# Patient Record
Sex: FEMALE | Race: WHITE | URBAN - METROPOLITAN AREA
[De-identification: names, ages, dates, MRNs, and addresses within clinical notes are randomized per-mention and may not be internally consistent; named-entity substitution may affect disease eponyms.]

---

## 2023-01-18 ENCOUNTER — OFFICE VISIT (OUTPATIENT)
Dept: URBAN - METROPOLITAN AREA CLINIC 58 | Facility: CLINIC | Age: 73
End: 2023-01-18
Payer: MEDICARE

## 2023-01-18 DIAGNOSIS — Z79.899 OTHER LONG TERM (CURRENT) DRUG THERAPY: ICD-10-CM

## 2023-01-18 DIAGNOSIS — M35.01 SJOGREN SYNDROME WITH KERATOCONJUNCTIVITIS: Primary | ICD-10-CM

## 2023-01-18 PROCEDURE — 92004 COMPRE OPH EXAM NEW PT 1/>: CPT | Performed by: OPTOMETRIST

## 2023-01-18 RX ORDER — FLUOROMETHOLONE 1 MG/ML
0.1 % SOLUTION/ DROPS OPHTHALMIC
Qty: 5 | Refills: 0 | Status: ACTIVE
Start: 2023-01-18

## 2023-01-18 ASSESSMENT — INTRAOCULAR PRESSURE
OS: 20
OD: 16

## 2023-01-18 NOTE — IMPRESSION/PLAN
Impression: Sjogren syndrome with keratoconjunctivitis: M35.01. Plan: Diagnosis explained in detail, condition seems severe. Start Prednisolone 1gtt OU tid x 1 wk then bid x 1 wk then qd x 1 wk then d/c. Pt to call if condition worsens. Continue AT's  PRN.

## 2023-01-18 NOTE — IMPRESSION/PLAN
Impression: Other long term (current) drug therapy: Z79.899. Plan: Patient under the care of Dr. Carlton Barrios for Plaquenil exams.

## 2023-02-03 ENCOUNTER — OFFICE VISIT (OUTPATIENT)
Dept: URBAN - METROPOLITAN AREA CLINIC 58 | Facility: CLINIC | Age: 73
End: 2023-02-03
Payer: MEDICARE

## 2023-02-03 DIAGNOSIS — H10.45 OTHER CHRONIC ALLERGIC CONJUNCTIVITIS: ICD-10-CM

## 2023-02-03 DIAGNOSIS — M35.01 SJOGREN SYNDROME WITH KERATOCONJUNCTIVITIS: Primary | ICD-10-CM

## 2023-02-03 PROCEDURE — 99213 OFFICE O/P EST LOW 20 MIN: CPT | Performed by: OPTOMETRIST

## 2023-02-03 RX ORDER — PREDNISONE 10 MG/1
10 MG TABLET ORAL
Qty: 28 | Refills: 0 | Status: ACTIVE
Start: 2023-02-03

## 2023-02-03 ASSESSMENT — INTRAOCULAR PRESSURE
OS: 16
OD: 15

## 2023-02-03 ASSESSMENT — KERATOMETRY
OD: 45.25
OS: 45.13

## 2023-02-03 NOTE — IMPRESSION/PLAN
Impression: Other chronic allergic conjunctivitis: H10.45. Plan: Discussed diagnosis in detail with patient. Start Prednisone 40mg PO QD x 1 wk, then d/c. Will continue to monitor.

## 2023-02-03 NOTE — IMPRESSION/PLAN
Impression: Sjogren syndrome with keratoconjunctivitis: M35.01. Plan: Diagnosis explained in detail. Continue FML QD. Pt to call if condition worsens. Continue AT's  PRN.

## 2023-02-10 ENCOUNTER — OFFICE VISIT (OUTPATIENT)
Dept: URBAN - METROPOLITAN AREA CLINIC 58 | Facility: CLINIC | Age: 73
End: 2023-02-10
Payer: MEDICARE

## 2023-02-10 DIAGNOSIS — M35.01 SJOGREN SYNDROME WITH KERATOCONJUNCTIVITIS: ICD-10-CM

## 2023-02-10 DIAGNOSIS — H10.45 OTHER CHRONIC ALLERGIC CONJUNCTIVITIS: Primary | ICD-10-CM

## 2023-02-10 PROCEDURE — 99213 OFFICE O/P EST LOW 20 MIN: CPT | Performed by: OPTOMETRIST

## 2023-02-10 ASSESSMENT — INTRAOCULAR PRESSURE
OD: 16
OS: 16

## 2023-02-10 NOTE — IMPRESSION/PLAN
Impression: Other chronic allergic conjunctivitis: H10.45. Plan: Discussed diagnosis in detail with patient. Prednisone didn't seem to help much, maybe slightly better. Still looks and feels like allergies to me. Recommend pt going back to allergist for care.

## 2023-02-10 NOTE — IMPRESSION/PLAN
Impression: Sjogren syndrome with keratoconjunctivitis: M35.01. Plan: Diagnosis explained in detail. Continue FML QD until bottle runs out. Start pataday 1gtt OU bid or qd. Pt to call if condition worsens. Continue AT's  PRN.

## 2024-12-23 DIAGNOSIS — H10.45 OTHER CHRONIC ALLERGIC CONJUNCTIVITIS: Primary | ICD-10-CM

## 2024-12-23 DIAGNOSIS — Z79.899 OTHER LONG TERM (CURRENT) DRUG THERAPY: ICD-10-CM

## 2024-12-23 DIAGNOSIS — M35.01 SJOGREN SYNDROME WITH KERATOCONJUNCTIVITIS: ICD-10-CM

## 2024-12-23 DIAGNOSIS — H52.4 PRESBYOPIA: ICD-10-CM

## 2024-12-23 PROCEDURE — 92014 COMPRE OPH EXAM EST PT 1/>: CPT | Performed by: OPTOMETRIST

## 2024-12-23 PROCEDURE — 92134 CPTRZ OPH DX IMG PST SGM RTA: CPT | Performed by: OPTOMETRIST

## 2024-12-23 RX ORDER — PERFLUOROHEXYLOCTANE 1 MG/MG
100 % SOLUTION OPHTHALMIC
Qty: 3 | Refills: 3 | Status: ACTIVE
Start: 2024-12-23

## 2024-12-23 ASSESSMENT — INTRAOCULAR PRESSURE
OD: 12
OS: 14

## 2024-12-23 ASSESSMENT — VISUAL ACUITY
OD: 20/20
OS: 20/20

## 2025-01-17 ENCOUNTER — OFFICE VISIT (OUTPATIENT)
Dept: URBAN - METROPOLITAN AREA CLINIC 58 | Facility: CLINIC | Age: 75
End: 2025-01-17
Payer: MEDICARE

## 2025-01-17 DIAGNOSIS — M35.01 SJOGREN SYNDROME WITH KERATOCONJUNCTIVITIS: Primary | ICD-10-CM

## 2025-01-17 PROCEDURE — 99213 OFFICE O/P EST LOW 20 MIN: CPT | Performed by: OPTOMETRIST

## 2025-01-17 ASSESSMENT — INTRAOCULAR PRESSURE
OD: 12
OS: 16